# Patient Record
Sex: FEMALE | Race: WHITE | NOT HISPANIC OR LATINO
[De-identification: names, ages, dates, MRNs, and addresses within clinical notes are randomized per-mention and may not be internally consistent; named-entity substitution may affect disease eponyms.]

---

## 2018-05-01 ENCOUNTER — FORM ENCOUNTER (OUTPATIENT)
Age: 68
End: 2018-05-01

## 2018-05-13 ENCOUNTER — FORM ENCOUNTER (OUTPATIENT)
Age: 68
End: 2018-05-13

## 2018-05-14 ENCOUNTER — RESULT REVIEW (OUTPATIENT)
Age: 68
End: 2018-05-14

## 2018-05-22 ENCOUNTER — FORM ENCOUNTER (OUTPATIENT)
Age: 68
End: 2018-05-22

## 2018-07-25 PROBLEM — Z00.00 ENCOUNTER FOR PREVENTIVE HEALTH EXAMINATION: Status: ACTIVE | Noted: 2018-07-25

## 2018-07-27 ENCOUNTER — RECORD ABSTRACTING (OUTPATIENT)
Age: 68
End: 2018-07-27

## 2018-07-27 DIAGNOSIS — Z92.89 PERSONAL HISTORY OF OTHER MEDICAL TREATMENT: ICD-10-CM

## 2018-07-27 DIAGNOSIS — Z78.9 OTHER SPECIFIED HEALTH STATUS: ICD-10-CM

## 2018-07-27 DIAGNOSIS — Z86.39 PERSONAL HISTORY OF OTHER ENDOCRINE, NUTRITIONAL AND METABOLIC DISEASE: ICD-10-CM

## 2018-07-27 DIAGNOSIS — I10 ESSENTIAL (PRIMARY) HYPERTENSION: ICD-10-CM

## 2018-07-27 DIAGNOSIS — Z86.018 PERSONAL HISTORY OF OTHER BENIGN NEOPLASM: ICD-10-CM

## 2018-07-27 RX ORDER — AMLODIPINE BESYLATE AND VALSARTAN 10; 160 MG/1; MG/1
10-160 TABLET, FILM COATED ORAL
Refills: 0 | Status: ACTIVE | COMMUNITY

## 2018-09-11 ENCOUNTER — FORM ENCOUNTER (OUTPATIENT)
Age: 68
End: 2018-09-11

## 2018-09-12 ENCOUNTER — APPOINTMENT (OUTPATIENT)
Dept: GYNECOLOGIC ONCOLOGY | Facility: CLINIC | Age: 68
End: 2018-09-12
Payer: MEDICARE

## 2018-09-12 PROCEDURE — 76857 US EXAM PELVIC LIMITED: CPT | Mod: 59

## 2018-09-12 PROCEDURE — 76830 TRANSVAGINAL US NON-OB: CPT | Mod: 59

## 2018-09-12 PROCEDURE — 99214 OFFICE O/P EST MOD 30 MIN: CPT | Mod: 25

## 2018-09-12 PROCEDURE — 76376 3D RENDER W/INTRP POSTPROCES: CPT | Mod: TC,59

## 2018-12-06 ENCOUNTER — APPOINTMENT (OUTPATIENT)
Dept: GASTROENTEROLOGY | Facility: CLINIC | Age: 68
End: 2018-12-06
Payer: MEDICARE

## 2018-12-06 VITALS
SYSTOLIC BLOOD PRESSURE: 130 MMHG | HEIGHT: 62 IN | WEIGHT: 175 LBS | BODY MASS INDEX: 32.2 KG/M2 | DIASTOLIC BLOOD PRESSURE: 80 MMHG | HEART RATE: 82 BPM

## 2018-12-06 DIAGNOSIS — R10.84 GENERALIZED ABDOMINAL PAIN: ICD-10-CM

## 2018-12-06 DIAGNOSIS — Z12.11 ENCOUNTER FOR SCREENING FOR MALIGNANT NEOPLASM OF COLON: ICD-10-CM

## 2018-12-06 DIAGNOSIS — Z86.39 PERSONAL HISTORY OF OTHER ENDOCRINE, NUTRITIONAL AND METABOLIC DISEASE: ICD-10-CM

## 2018-12-06 PROCEDURE — 99204 OFFICE O/P NEW MOD 45 MIN: CPT

## 2018-12-06 PROCEDURE — 82272 OCCULT BLD FECES 1-3 TESTS: CPT

## 2019-02-07 ENCOUNTER — RESULT REVIEW (OUTPATIENT)
Age: 69
End: 2019-02-07

## 2019-02-18 ENCOUNTER — RESULT REVIEW (OUTPATIENT)
Age: 69
End: 2019-02-18

## 2019-05-06 ENCOUNTER — APPOINTMENT (OUTPATIENT)
Dept: GASTROENTEROLOGY | Facility: CLINIC | Age: 69
End: 2019-05-06
Payer: MEDICARE

## 2019-05-06 VITALS
HEART RATE: 79 BPM | HEIGHT: 62 IN | SYSTOLIC BLOOD PRESSURE: 130 MMHG | BODY MASS INDEX: 32.94 KG/M2 | DIASTOLIC BLOOD PRESSURE: 70 MMHG | WEIGHT: 179 LBS

## 2019-05-06 DIAGNOSIS — R10.32 LEFT LOWER QUADRANT PAIN: ICD-10-CM

## 2019-05-06 PROCEDURE — 99214 OFFICE O/P EST MOD 30 MIN: CPT

## 2019-05-06 NOTE — REASON FOR VISIT
[Follow-Up: _____] : a [unfilled] follow-up visit [FreeTextEntry1] : abdominal pain, family hx of colon cancer

## 2019-05-06 NOTE — HISTORY OF PRESENT ILLNESS
[de-identified] : The patient is to follow up with abdominal pain. She is quadrant and left lower quadrant pain for 8 months. Symptoms are mild-to-moderate. Symptoms seem to be more in the left side. It's a cramping pain. She denies constipation or diarrhea. She does have a family history of colon cancer in her mother. She had a colonoscopy in 2003 which showed diverticulosis and hemorrhoids as well as a hyperplastic polyp. Check CAT scan done in February which showed assistant fatty liver as well as retained stool. She was told to take Benefiber which has bleed she states she is having regular bowel movements but still gets his left lower quadrant cramping pain. She was IFOBT negative. The patient states she saw her GYN and workup is negative.

## 2019-05-06 NOTE — ASSESSMENT
[FreeTextEntry1] : A/P\par Patient is here for bilateral lower quadrant pain but worse on the left. She has a family history of colon cancer.\par -I discussed the risks and benefits of colonoscopy and patient was given the opportunity to ask questions. Colonoscopy is being done to rule out colon cancer, polyps or AVMs. Patient is medically optimized for the procedure.\par -MiraLax prep

## 2019-05-06 NOTE — PHYSICAL EXAM
[General Appearance - Alert] : alert [General Appearance - In No Acute Distress] : in no acute distress [General Appearance - Well Nourished] : well nourished [Sclera] : the sclera and conjunctiva were normal [General Appearance - Well Developed] : well developed [Outer Ear] : the ears and nose were normal in appearance [Both Tympanic Membranes Were Examined] : both tympanic membranes were normal [Hearing Threshold Finger Rub Not [place]] : hearing was normal [Neck Appearance] : the appearance of the neck was normal [Neck Cervical Mass (___cm)] : no neck mass was observed [Respiration, Rhythm And Depth] : normal respiratory rhythm and effort [] : no respiratory distress [Exaggerated Use Of Accessory Muscles For Inspiration] : no accessory muscle use [Auscultation Breath Sounds / Voice Sounds] : lungs were clear to auscultation bilaterally [Heart Rate And Rhythm] : heart rate was normal and rhythm regular [Apical Impulse] : the apical impulse was normal [Heart Sounds] : normal S1 and S2 [Abdomen Soft] : soft [Bowel Sounds] : normal bowel sounds [Abdomen Tenderness] : non-tender [Affect] : the affect was normal [Oriented To Time, Place, And Person] : oriented to person, place, and time [Impaired Insight] : insight and judgment were intact [Mood] : the mood was normal

## 2019-06-26 ENCOUNTER — APPOINTMENT (OUTPATIENT)
Dept: GASTROENTEROLOGY | Facility: GI CENTER | Age: 69
End: 2019-06-26
Payer: MEDICARE

## 2019-06-26 ENCOUNTER — OUTPATIENT (OUTPATIENT)
Dept: OUTPATIENT SERVICES | Facility: HOSPITAL | Age: 69
LOS: 1 days | End: 2019-06-26
Payer: MEDICARE

## 2019-06-26 DIAGNOSIS — Z80.0 FAMILY HISTORY OF MALIGNANT NEOPLASM OF DIGESTIVE ORGANS: ICD-10-CM

## 2019-06-26 DIAGNOSIS — R10.32 LEFT LOWER QUADRANT PAIN: ICD-10-CM

## 2019-06-26 LAB — GLUCOSE BLDC GLUCOMTR-MCNC: 144 MG/DL — HIGH (ref 70–99)

## 2019-06-26 PROCEDURE — 45378 DIAGNOSTIC COLONOSCOPY: CPT

## 2019-06-26 PROCEDURE — 82962 GLUCOSE BLOOD TEST: CPT

## 2019-06-26 NOTE — ASSESSMENT
[FreeTextEntry1] : A/P\par family hx of colon cancer\par Hemorrhoids\par Colonoscopy in 5 years\par _ please note - I did not see diverticulosis this exam ( though tics noted on last exam)

## 2019-06-26 NOTE — PROCEDURE
[Procedure Explained] : The procedure was explained [Colon Cancer Screening] : colon cancer screening [Allergies Reviewed] : allergies reviewed. [Benefits] : benefits [Risks] : Risks [Alternatives] : alternatives [Infection] : risk of infection [Bleeding] : bleeding risk [Consent Obtained] : written consent was obtained prior to the procedure and is detailed in the patient's record [Bowel Prep Kit] : the patient took the appropriate bowel preparation kit as directed [Patient] : the patient [Approved Diet Followed] : the patient avoided solid foods and adhered to the approved diet list for 24 hours prior to the procedure [Automated Blood Pressure Cuff] : automated blood pressure cuff [Cardiac Monitor] : cardiac monitor [Pulse Oximeter] : pulse oximeter [2] : 2 [Sedation Clearance] : the patient was cleared for moderate sedation [Prep Qualtiy: ___] : Prep Quality:  [unfilled] [Withdrawal Time: ___] : Withdrawal Time:  [unfilled] [Performed By: ___] : Performed by:  ANNIE [Left Lateral Decubitus] : The patient was positioned in the left lateral decubitus position [Cecum (Landmarks/Transillum)] : and guided to the cecum which was identified by the anatomic landmarks of the appendiceal orifice and ileocecal valve and by transillumination in the right lower quadrant [Insufflated] : insufflated [Single Pass Needed] : after a single pass [Retroflex View] : a retroflex view of the rectum was performed [Normal] : Normal [Tolerated Well] : the patient tolerated the procedure well [Hemorrhoids] : hemorrhoids [No Complications] : There were no complications [Vital Signs Stable] : the vital signs were stable [Abnormal Rectum] : a normal rectum [External Hemorrhoids] : no external hemorrhoids [Patient Rotated Into Alternating Positions] : the patient was not rotated [de-identified] : [id number]

## 2019-06-26 NOTE — REASON FOR VISIT
[Procedure: _________] : a [unfilled] procedure visit [FreeTextEntry1] : colonoscopy [Colonoscopy] : a colonoscopy [FreeTextEntry2] : family hx of colon cancer ( mother)

## 2019-06-26 NOTE — PROCEDURE
[Procedure Explained] : The procedure was explained [Colon Cancer Screening] : colon cancer screening [Allergies Reviewed] : allergies reviewed. [Benefits] : benefits [Risks] : Risks [Bleeding] : bleeding risk [Alternatives] : alternatives [Infection] : risk of infection [Consent Obtained] : written consent was obtained prior to the procedure and is detailed in the patient's record [Bowel Prep Kit] : the patient took the appropriate bowel preparation kit as directed [Patient] : the patient [Approved Diet Followed] : the patient avoided solid foods and adhered to the approved diet list for 24 hours prior to the procedure [Automated Blood Pressure Cuff] : automated blood pressure cuff [Cardiac Monitor] : cardiac monitor [2] : 2 [Pulse Oximeter] : pulse oximeter [Prep Qualtiy: ___] : Prep Quality:  [unfilled] [Sedation Clearance] : the patient was cleared for moderate sedation [Withdrawal Time: ___] : Withdrawal Time:  [unfilled] [Left Lateral Decubitus] : The patient was positioned in the left lateral decubitus position [Performed By: ___] : Performed by:  ANNIE [Cecum (Landmarks/Transillum)] : and guided to the cecum which was identified by the anatomic landmarks of the appendiceal orifice and ileocecal valve and by transillumination in the right lower quadrant [Retroflex View] : a retroflex view of the rectum was performed [Single Pass Needed] : after a single pass [Insufflated] : insufflated [Normal] : Normal [Hemorrhoids] : hemorrhoids [Tolerated Well] : the patient tolerated the procedure well [Vital Signs Stable] : the vital signs were stable [No Complications] : There were no complications [Abnormal Rectum] : a normal rectum [External Hemorrhoids] : no external hemorrhoids [Patient Rotated Into Alternating Positions] : the patient was not rotated [de-identified] : [id number]

## 2019-06-26 NOTE — PHYSICAL EXAM
[General Appearance - Alert] : alert [Sclera] : the sclera and conjunctiva were normal [General Appearance - Well Nourished] : well nourished [General Appearance - In No Acute Distress] : in no acute distress [Both Tympanic Membranes Were Examined] : both tympanic membranes were normal [Outer Ear] : the ears and nose were normal in appearance [Hearing Threshold Finger Rub Not Early] : hearing was normal [Neck Appearance] : the appearance of the neck was normal [Respiration, Rhythm And Depth] : normal respiratory rhythm and effort [Exaggerated Use Of Accessory Muscles For Inspiration] : no accessory muscle use [Auscultation Breath Sounds / Voice Sounds] : lungs were clear to auscultation bilaterally [Apical Impulse] : the apical impulse was normal [Heart Rate And Rhythm] : heart rate was normal and rhythm regular [Heart Sounds] : normal S1 and S2 [Bowel Sounds] : normal bowel sounds [Abdomen Soft] : soft [Abdomen Tenderness] : non-tender [Skin Color & Pigmentation] : normal skin color and pigmentation [Skin Turgor] : normal skin turgor [] : no rash [Affect] : the affect was normal [Impaired Insight] : insight and judgment were intact [Oriented To Time, Place, And Person] : oriented to person, place, and time

## 2019-06-26 NOTE — REASON FOR VISIT
[FreeTextEntry1] : colonoscopy [Procedure: _________] : a [unfilled] procedure visit [Colonoscopy] : a colonoscopy [FreeTextEntry2] : family hx of colon cancer ( mother)

## 2019-06-26 NOTE — PHYSICAL EXAM
[General Appearance - Alert] : alert [Sclera] : the sclera and conjunctiva were normal [General Appearance - In No Acute Distress] : in no acute distress [General Appearance - Well Nourished] : well nourished [Hearing Threshold Finger Rub Not [place]] : hearing was normal [Both Tympanic Membranes Were Examined] : both tympanic membranes were normal [Outer Ear] : the ears and nose were normal in appearance [Neck Appearance] : the appearance of the neck was normal [Respiration, Rhythm And Depth] : normal respiratory rhythm and effort [Exaggerated Use Of Accessory Muscles For Inspiration] : no accessory muscle use [Auscultation Breath Sounds / Voice Sounds] : lungs were clear to auscultation bilaterally [Apical Impulse] : the apical impulse was normal [Heart Rate And Rhythm] : heart rate was normal and rhythm regular [Heart Sounds] : normal S1 and S2 [Bowel Sounds] : normal bowel sounds [Abdomen Soft] : soft [Abdomen Tenderness] : non-tender [Skin Color & Pigmentation] : normal skin color and pigmentation [Skin Turgor] : normal skin turgor [] : no rash [Oriented To Time, Place, And Person] : oriented to person, place, and time [Impaired Insight] : insight and judgment were intact [Affect] : the affect was normal

## 2019-08-05 PROBLEM — R10.32 LEFT LOWER QUADRANT PAIN: Status: ACTIVE | Noted: 2019-05-06

## 2020-01-21 ENCOUNTER — APPOINTMENT (OUTPATIENT)
Dept: GASTROENTEROLOGY | Facility: CLINIC | Age: 70
End: 2020-01-21
Payer: MEDICARE

## 2020-01-21 VITALS
WEIGHT: 175 LBS | BODY MASS INDEX: 32.2 KG/M2 | SYSTOLIC BLOOD PRESSURE: 130 MMHG | RESPIRATION RATE: 16 BRPM | OXYGEN SATURATION: 98 % | HEIGHT: 62 IN | DIASTOLIC BLOOD PRESSURE: 80 MMHG | HEART RATE: 82 BPM

## 2020-01-21 DIAGNOSIS — K21.9 GASTRO-ESOPHAGEAL REFLUX DISEASE W/OUT ESOPHAGITIS: ICD-10-CM

## 2020-01-21 PROCEDURE — 99214 OFFICE O/P EST MOD 30 MIN: CPT

## 2020-01-21 NOTE — HISTORY OF PRESENT ILLNESS
[de-identified] : The patient is alert history of colon cancer, sister had a hepatitis, GERD\par     The patient is a family history of colon cancer. Her last colonoscopy was June of 2019 which showed internal hemorrhoids. No polyps. She had a hyperplastic polyp in the past.\par     Patient has mildly elevated liver function tests. In December her ALT was 50 and AST is 46. Ultrasound showed fatty liver and liver cysts. Hepatitis B and C. were negative in December.\par    Patient has had blurred or 10 years. She gets symptoms of upper epigastric discomfort. It occurs every day. It is a burning discomfort which will last an hour. It is worse at night. She has decreased appetite and bloating. Despite having symptoms everyday she only takes omeprazole 40 mg once a week. Her last EGD was 5 years ago.

## 2020-01-21 NOTE — PHYSICAL EXAM
[General Appearance - Alert] : alert [General Appearance - In No Acute Distress] : in no acute distress [General Appearance - Well Nourished] : well nourished [General Appearance - Well Developed] : well developed [Outer Ear] : the ears and nose were normal in appearance [Sclera] : the sclera and conjunctiva were normal [Neck Appearance] : the appearance of the neck was normal [Neck Cervical Mass (___cm)] : no neck mass was observed [Respiration, Rhythm And Depth] : normal respiratory rhythm and effort [Exaggerated Use Of Accessory Muscles For Inspiration] : no accessory muscle use [Auscultation Breath Sounds / Voice Sounds] : lungs were clear to auscultation bilaterally [Apical Impulse] : the apical impulse was normal [Heart Rate And Rhythm] : heart rate was normal and rhythm regular [Heart Sounds] : normal S1 and S2 [Bowel Sounds] : normal bowel sounds [Abdomen Soft] : soft [Abdomen Tenderness] : non-tender [Femoral Lymph Nodes Enlarged Bilaterally] : femoral [Skin Color & Pigmentation] : normal skin color and pigmentation [] : no rash [Skin Turgor] : normal skin turgor [Oriented To Time, Place, And Person] : oriented to person, place, and time [Impaired Insight] : insight and judgment were intact [Affect] : the affect was normal

## 2020-01-21 NOTE — ASSESSMENT
[FreeTextEntry1] : A/P\par -gerd\par -Today's instructions for acid reflux include avoid provocative foods. For example citrus alcohol coffee chocolate mints. Smaller meals, no eating 3 hours prior to bedtime and elevate head of the bed prior to sleep.\par -pt told to take prilosec 40 qd\par  I discussed the risks and benefits of EGD and patient was given opportunity to ask questions. EGD to r/o Rey's  esophagus, PUD, mass, AVM'S. Pt is medically optimized for EGD\par \par Steatohepatitis\par - low fat diet , Hep B, C negative\par \par family hx of colon cancer- Colonoscopy in 4 years- 2023

## 2020-03-06 ENCOUNTER — RESULT REVIEW (OUTPATIENT)
Age: 70
End: 2020-03-06

## 2020-03-06 ENCOUNTER — OUTPATIENT (OUTPATIENT)
Dept: OUTPATIENT SERVICES | Facility: HOSPITAL | Age: 70
LOS: 1 days | End: 2020-03-06
Payer: MEDICARE

## 2020-03-06 ENCOUNTER — APPOINTMENT (OUTPATIENT)
Dept: GASTROENTEROLOGY | Facility: GI CENTER | Age: 70
End: 2020-03-06
Payer: MEDICARE

## 2020-03-06 DIAGNOSIS — K21.9 GASTRO-ESOPHAGEAL REFLUX DISEASE WITHOUT ESOPHAGITIS: ICD-10-CM

## 2020-03-06 LAB — GLUCOSE BLDC GLUCOMTR-MCNC: 164 MG/DL — HIGH (ref 70–99)

## 2020-03-06 PROCEDURE — 88342 IMHCHEM/IMCYTCHM 1ST ANTB: CPT | Mod: 26

## 2020-03-06 PROCEDURE — 88305 TISSUE EXAM BY PATHOLOGIST: CPT | Mod: 26

## 2020-03-06 PROCEDURE — 43239 EGD BIOPSY SINGLE/MULTIPLE: CPT

## 2020-03-06 PROCEDURE — 82962 GLUCOSE BLOOD TEST: CPT

## 2020-03-06 PROCEDURE — 88342 IMHCHEM/IMCYTCHM 1ST ANTB: CPT

## 2020-03-06 PROCEDURE — 88305 TISSUE EXAM BY PATHOLOGIST: CPT

## 2020-03-06 NOTE — ASSESSMENT
[FreeTextEntry1] : A/P\par GERD\par Today's instructions for acid reflux include avoid provocative foods. For example citrus alcohol coffee chocolate mints. Smaller meals, no eating 3 hours prior to bedtime and elevate head of the bed prior to sleep.\par  - Mildly irregular Z line\par  call in one week for biopsy of stomach  and esophagus to r/o Hp and Rey's esophagus\par F/U in 3 months\par Prilosec 40 mg qd\par

## 2020-03-06 NOTE — PROCEDURE
[GERD] : GERD [Sent to Pathology] : was sent to pathology for analysis [Tolerated Well] : the patient tolerated the procedure well [Vital Signs Stable] : the vital signs were stable [With Biopsy] : with biopsy [Gastric Ulcer] : gastric ulcer [Procedure Explained] : The procedure was explained [Allergies Reviewed] : allergies reviewed. [Risks] : Risks [Benefits] : benefits [Alternatives] : alternatives [Consent Obtained] : written consent was obtained prior to the procedure and is detailed in the patient's record [Patient] : the patient [Automated Blood Pressure Cuff] : automated blood pressure cuff [Cardiac Monitor] : cardiac monitor [Pulse Oximeter] : pulse oximeter [2] : 2 [Sedation Clearance] : the patient was cleared for moderate sedation [Performed By: ___] : Performed by:  ANNIE [Normal] : Normal [Biopsy] : biopsy [de-identified] :  Midly irregular Z line [de-identified] : Random biopsy on antrum and body

## 2020-03-06 NOTE — PHYSICAL EXAM
[General Appearance - Alert] : alert [General Appearance - In No Acute Distress] : in no acute distress [General Appearance - Well Nourished] : well nourished [General Appearance - Well Developed] : well developed [Sclera] : the sclera and conjunctiva were normal [Outer Ear] : the ears and nose were normal in appearance [Neck Appearance] : the appearance of the neck was normal [] : no respiratory distress [Respiration, Rhythm And Depth] : normal respiratory rhythm and effort [Exaggerated Use Of Accessory Muscles For Inspiration] : no accessory muscle use [Apical Impulse] : the apical impulse was normal [Heart Rate And Rhythm] : heart rate was normal and rhythm regular [Heart Sounds] : normal S1 and S2 [Bowel Sounds] : normal bowel sounds [Abdomen Soft] : soft [Abdomen Tenderness] : non-tender [Skin Color & Pigmentation] : normal skin color and pigmentation [Oriented To Time, Place, And Person] : oriented to person, place, and time [Impaired Insight] : insight and judgment were intact [Affect] : the affect was normal

## 2020-03-06 NOTE — PROCEDURE
[GERD] : GERD [Sent to Pathology] : was sent to pathology for analysis [Tolerated Well] : the patient tolerated the procedure well [Vital Signs Stable] : the vital signs were stable [With Biopsy] : with biopsy [Gastric Ulcer] : gastric ulcer [Procedure Explained] : The procedure was explained [Allergies Reviewed] : allergies reviewed. [Risks] : Risks [Benefits] : benefits [Alternatives] : alternatives [Consent Obtained] : written consent was obtained prior to the procedure and is detailed in the patient's record [Patient] : the patient [Automated Blood Pressure Cuff] : automated blood pressure cuff [Cardiac Monitor] : cardiac monitor [Pulse Oximeter] : pulse oximeter [2] : 2 [Sedation Clearance] : the patient was cleared for moderate sedation [Performed By: ___] : Performed by:  ANNIE [Normal] : Normal [Biopsy] : biopsy [de-identified] :  Midly irregular Z line [de-identified] : Random biopsy on antrum and body

## 2020-03-06 NOTE — PROCEDURE
[GERD] : GERD [Sent to Pathology] : was sent to pathology for analysis [Tolerated Well] : the patient tolerated the procedure well [Vital Signs Stable] : the vital signs were stable [With Biopsy] : with biopsy [Gastric Ulcer] : gastric ulcer [Procedure Explained] : The procedure was explained [Allergies Reviewed] : allergies reviewed. [Risks] : Risks [Benefits] : benefits [Alternatives] : alternatives [Consent Obtained] : written consent was obtained prior to the procedure and is detailed in the patient's record [Patient] : the patient [Automated Blood Pressure Cuff] : automated blood pressure cuff [Cardiac Monitor] : cardiac monitor [Pulse Oximeter] : pulse oximeter [2] : 2 [Sedation Clearance] : the patient was cleared for moderate sedation [Performed By: ___] : Performed by:  ANNIE [Normal] : Normal [Biopsy] : biopsy [de-identified] :  Midly irregular Z line [de-identified] : Random biopsy on antrum and body

## 2020-03-11 LAB — SURGICAL PATHOLOGY STUDY: SIGNIFICANT CHANGE UP

## 2020-05-28 ENCOUNTER — APPOINTMENT (OUTPATIENT)
Dept: GASTROENTEROLOGY | Facility: CLINIC | Age: 70
End: 2020-05-28

## 2020-12-16 PROBLEM — Z12.11 ENCOUNTER FOR SCREENING COLONOSCOPY: Status: RESOLVED | Noted: 2018-12-06 | Resolved: 2020-12-16

## 2023-07-26 ENCOUNTER — NON-APPOINTMENT (OUTPATIENT)
Age: 73
End: 2023-07-26

## 2023-07-26 ENCOUNTER — APPOINTMENT (OUTPATIENT)
Dept: OTOLARYNGOLOGY | Facility: CLINIC | Age: 73
End: 2023-07-26
Payer: MEDICARE

## 2023-07-26 VITALS — WEIGHT: 165 LBS | HEIGHT: 62 IN | BODY MASS INDEX: 30.36 KG/M2

## 2023-07-26 DIAGNOSIS — E04.2 NONTOXIC MULTINODULAR GOITER: ICD-10-CM

## 2023-07-26 DIAGNOSIS — H61.20 IMPACTED CERUMEN, UNSPECIFIED EAR: ICD-10-CM

## 2023-07-26 PROCEDURE — 99204 OFFICE O/P NEW MOD 45 MIN: CPT | Mod: 25

## 2023-07-26 PROCEDURE — 69210 REMOVE IMPACTED EAR WAX UNI: CPT

## 2023-07-26 NOTE — ASSESSMENT
[FreeTextEntry1] : US DONE AT [facility] 06/29/2023 REVIEWED\par MULTINODULAR THYROID\par NON OF THE NODULES MEETS THE CRITERIA FOR FNA AT PRESENT\par ANNUAL US OR SOONER IF ANY SYMPTOMS OR CHANGE\par ENDO FOLLOW UP\par AVOID Q TIPS

## 2023-07-26 NOTE — REVIEW OF SYSTEMS
[Ear Pain] : ear pain [Ear Itch] : ear itch [Heartburn] : heartburn [Negative] : Heme/Lymph [Patient Intake Form Reviewed] : Patient intake form was reviewed [FreeTextEntry1] : difficulty swallowing, headache, fatigue, joint aches

## 2023-07-26 NOTE — PHYSICAL EXAM
[de-identified] : MARTA IMPACTED CERUMEN REMOVED/ HEARING IMPROVED [Normal] : mucosa is normal [Midline] : trachea located in midline position

## 2024-01-22 ENCOUNTER — APPOINTMENT (OUTPATIENT)
Dept: GYNECOLOGIC ONCOLOGY | Facility: CLINIC | Age: 74
End: 2024-01-22
Payer: MEDICARE

## 2024-01-22 PROCEDURE — 99204 OFFICE O/P NEW MOD 45 MIN: CPT | Mod: 25

## 2024-01-22 PROCEDURE — 58100 BIOPSY OF UTERUS LINING: CPT | Mod: 59

## 2024-01-22 RX ORDER — MISOPROSTOL 100 UG/1
100 TABLET ORAL
Refills: 0 | Status: DISCONTINUED | COMMUNITY
End: 2024-01-22

## 2024-01-22 RX ORDER — ORAL SEMAGLUTIDE 14 MG/1
TABLET ORAL
Refills: 0 | Status: ACTIVE | COMMUNITY

## 2024-01-22 RX ORDER — SITAGLIPTIN AND METFORMIN HYDROCHLORIDE 50; 500 MG/1; MG/1
50-500 TABLET, FILM COATED ORAL
Refills: 0 | Status: DISCONTINUED | COMMUNITY
End: 2024-01-22

## 2024-01-23 NOTE — PROCEDURE
[Endometrial Biopsy] : an endometrial biopsy [Thickened Endometrium] : thickened endometrium [Patient] : the patient [Verbal Consent] : verbal consent was obtained prior to the procedure and is detailed in the patient's record [Betadine] : betadine [Silver Nitrate] : silver nitrate [Direct Pressure] : direct pressure [No Complications] : none [Yes] : the specimen was sent to pathology [Tolerated Well] : the patient tolerated the procedure well

## 2024-01-24 NOTE — HISTORY OF PRESENT ILLNESS
[FreeTextEntry1] : 74yo  LMP presents today with referral from Dr. Chong for thickened endometrial lining. Pt reports pelvic pain L>R for a few months now. She reports urinary frequency and burning for a few months as well. She states she had urinalysis with PCP which was negative. She underwent pelvic US on 23 revealing 1.2cm thick endometrial lining which is cystic in appearance and may reflect hyperplasia. Lining previously measuring 1.5cm's. Pt was also previously seen by our practice in 2018 for thickened lining as well, s/p D&C which was benign at that time. Pt denies abnormal bleeding or issues with bowel habits.   LPAP- , normal pp LMammo- 2023: probably benign mass for which 6 month targeted US was reccomended. Pt requesting referral to breast surgeon as recc by her PCP, referral to Dr. Hinton provided.  LColonoscopy-appt 2024 for repeat. Last , normal pp LBone Density Scan- , normal pp

## 2024-01-24 NOTE — REVIEW OF SYSTEMS
[Negative] : Musculoskeletal [Hematuria] : no hematuria [Dysuria] : no dysuria [Vaginal Discharge] : no vaginal discharge [Dyspareunia] : no dyspareunia [Abn Vag Bleeding] : no abnormal vaginal bleeding [Incontinence] : no incontinence [FreeTextEntry4] : urinary frequency and burning, pelvic pain

## 2024-01-24 NOTE — CHIEF COMPLAINT
[FreeTextEntry1] : [facility] [phone] at [address] [place] [address]

## 2024-01-24 NOTE — END OF VISIT
[FreeTextEntry3] :  I, Dr. Marin Walker personally performed the evaluation and management (E/M) services for this new patient. That E/M includes conducting the initial examination, assessing all conditions, and establishing the plan of care.  Today, Debi Barrera PA-C, was here to observe my evaluation and management services for this patient to be followed going forward.

## 2024-01-24 NOTE — ASSESSMENT
[FreeTextEntry1] : 74yo with thickened endometrium, pelvic pain.   I discussed with patient that I would recommend biopsy to rule out a precancer or cancer despite absence of PMB due to the thickness of her lining noted on imaging. I offered patient option to perform today in office vs. under anesthesia. Pt opted to try today in office which was successful.

## 2024-01-24 NOTE — PHYSICAL EXAM
[Chaperone Present] : A chaperone was present in the examining room during all aspects of the physical examination [Normal] : Bimanual Exam: Normal [Abnormal] : Bimanual Exam: Abnormal [FreeTextEntry1] : Mimi Barrera PA-C [de-identified] : uterus felt to be adhered to bladder.

## 2024-01-25 LAB — CORE LAB BIOPSY: NORMAL

## 2024-01-26 ENCOUNTER — TRANSCRIPTION ENCOUNTER (OUTPATIENT)
Age: 74
End: 2024-01-26

## 2024-02-09 ENCOUNTER — APPOINTMENT (OUTPATIENT)
Dept: GYNECOLOGIC ONCOLOGY | Facility: CLINIC | Age: 74
End: 2024-02-09
Payer: MEDICARE

## 2024-02-09 PROCEDURE — 99214 OFFICE O/P EST MOD 30 MIN: CPT

## 2024-02-09 NOTE — END OF VISIT
[FreeTextEntry3] : D&C, hysteroscopic polypectomy PCP clearance, CBC, BMP, ECG, presurgical testing [Time Spent: ___ minutes] : I have spent [unfilled] minutes of time on the encounter.

## 2024-02-09 NOTE — ASSESSMENT
[FreeTextEntry1] : Pt is a 72 yo with PMB due to endometrial polyp. Pathology without atypia or malignancy. Recommendation for hysterescopic resection as without it she could continue to have bleeding. There is small risk polyps can turn cancerous. They agree to proceed with setting up D&C, hysterscopic polypectomy.  I discussed at length with the patient the nature, purpose, risks, benefits, and alternatives to dilation with curettage and possible hysteroscopy.   She understands the risks to include (but not be limited to): uterine perforation with possible need for laparoscopy and/or laparotomy; infection with need for hospitalization; fluid overload with possible critical illness as a consequence; and bleeding with need for transfusion.  The patient agrees to proceed.

## 2024-02-09 NOTE — HISTORY OF PRESENT ILLNESS
[Home] : at home, [unfilled] , at the time of the visit. [Other Location: e.g. Home (Enter Location, City,State)___] : at [unfilled] [Family Member] : family member [Verbal consent obtained from patient] : the patient, [unfilled] [FreeTextEntry1] : Pt is a 74 yo with PMB who presents for discussion of endometrial biopsy results. Her son the health care proxy is on the phone call and will convey discussion to patient.

## 2024-03-01 ENCOUNTER — OUTPATIENT (OUTPATIENT)
Dept: OUTPATIENT SERVICES | Facility: HOSPITAL | Age: 74
LOS: 1 days | End: 2024-03-01
Payer: MEDICARE

## 2024-03-01 DIAGNOSIS — N84.0 POLYP OF CORPUS UTERI: ICD-10-CM

## 2024-03-01 DIAGNOSIS — Z01.818 ENCOUNTER FOR OTHER PREPROCEDURAL EXAMINATION: ICD-10-CM

## 2024-03-01 LAB
A1C WITH ESTIMATED AVERAGE GLUCOSE RESULT: 6.8 % — HIGH (ref 4–5.6)
ANION GAP SERPL CALC-SCNC: 14 MMOL/L — SIGNIFICANT CHANGE UP (ref 5–17)
APPEARANCE UR: CLEAR — SIGNIFICANT CHANGE UP
APTT BLD: 30.6 SEC — SIGNIFICANT CHANGE UP (ref 24.5–35.6)
BASOPHILS # BLD AUTO: 0.08 K/UL — SIGNIFICANT CHANGE UP (ref 0–0.2)
BASOPHILS NFR BLD AUTO: 1 % — SIGNIFICANT CHANGE UP (ref 0–2)
BILIRUB UR-MCNC: NEGATIVE — SIGNIFICANT CHANGE UP
BUN SERPL-MCNC: 15.7 MG/DL — SIGNIFICANT CHANGE UP (ref 8–20)
CALCIUM SERPL-MCNC: 9.5 MG/DL — SIGNIFICANT CHANGE UP (ref 8.4–10.5)
CHLORIDE SERPL-SCNC: 103 MMOL/L — SIGNIFICANT CHANGE UP (ref 96–108)
CO2 SERPL-SCNC: 27 MMOL/L — SIGNIFICANT CHANGE UP (ref 22–29)
COLOR SPEC: YELLOW — SIGNIFICANT CHANGE UP
CREAT SERPL-MCNC: 0.64 MG/DL — SIGNIFICANT CHANGE UP (ref 0.5–1.3)
DIFF PNL FLD: NEGATIVE — SIGNIFICANT CHANGE UP
EGFR: 94 ML/MIN/1.73M2 — SIGNIFICANT CHANGE UP
EOSINOPHIL # BLD AUTO: 0.08 K/UL — SIGNIFICANT CHANGE UP (ref 0–0.5)
EOSINOPHIL NFR BLD AUTO: 1 % — SIGNIFICANT CHANGE UP (ref 0–6)
ESTIMATED AVERAGE GLUCOSE: 148 MG/DL — HIGH (ref 68–114)
GLUCOSE SERPL-MCNC: 104 MG/DL — HIGH (ref 70–99)
GLUCOSE UR QL: NEGATIVE MG/DL — SIGNIFICANT CHANGE UP
HCT VFR BLD CALC: 42.6 % — SIGNIFICANT CHANGE UP (ref 34.5–45)
HGB BLD-MCNC: 14.2 G/DL — SIGNIFICANT CHANGE UP (ref 11.5–15.5)
IMM GRANULOCYTES NFR BLD AUTO: 0.4 % — SIGNIFICANT CHANGE UP (ref 0–0.9)
INR BLD: 0.98 RATIO — SIGNIFICANT CHANGE UP (ref 0.85–1.18)
KETONES UR-MCNC: NEGATIVE MG/DL — SIGNIFICANT CHANGE UP
LEUKOCYTE ESTERASE UR-ACNC: NEGATIVE — SIGNIFICANT CHANGE UP
LYMPHOCYTES # BLD AUTO: 2.67 K/UL — SIGNIFICANT CHANGE UP (ref 1–3.3)
LYMPHOCYTES # BLD AUTO: 32.7 % — SIGNIFICANT CHANGE UP (ref 13–44)
MCHC RBC-ENTMCNC: 30.5 PG — SIGNIFICANT CHANGE UP (ref 27–34)
MCHC RBC-ENTMCNC: 33.3 GM/DL — SIGNIFICANT CHANGE UP (ref 32–36)
MCV RBC AUTO: 91.6 FL — SIGNIFICANT CHANGE UP (ref 80–100)
MONOCYTES # BLD AUTO: 0.63 K/UL — SIGNIFICANT CHANGE UP (ref 0–0.9)
MONOCYTES NFR BLD AUTO: 7.7 % — SIGNIFICANT CHANGE UP (ref 2–14)
NEUTROPHILS # BLD AUTO: 4.67 K/UL — SIGNIFICANT CHANGE UP (ref 1.8–7.4)
NEUTROPHILS NFR BLD AUTO: 57.2 % — SIGNIFICANT CHANGE UP (ref 43–77)
NITRITE UR-MCNC: NEGATIVE — SIGNIFICANT CHANGE UP
PH UR: 7 — SIGNIFICANT CHANGE UP (ref 5–8)
PLATELET # BLD AUTO: 309 K/UL — SIGNIFICANT CHANGE UP (ref 150–400)
POTASSIUM SERPL-MCNC: 4.6 MMOL/L — SIGNIFICANT CHANGE UP (ref 3.5–5.3)
POTASSIUM SERPL-SCNC: 4.6 MMOL/L — SIGNIFICANT CHANGE UP (ref 3.5–5.3)
PROT UR-MCNC: NEGATIVE MG/DL — SIGNIFICANT CHANGE UP
PROTHROM AB SERPL-ACNC: 10.9 SEC — SIGNIFICANT CHANGE UP (ref 9.5–13)
RBC # BLD: 4.65 M/UL — SIGNIFICANT CHANGE UP (ref 3.8–5.2)
RBC # FLD: 13.1 % — SIGNIFICANT CHANGE UP (ref 10.3–14.5)
SODIUM SERPL-SCNC: 143 MMOL/L — SIGNIFICANT CHANGE UP (ref 135–145)
SP GR SPEC: 1.01 — SIGNIFICANT CHANGE UP (ref 1–1.03)
UROBILINOGEN FLD QL: 0.2 MG/DL — SIGNIFICANT CHANGE UP (ref 0.2–1)
WBC # BLD: 8.16 K/UL — SIGNIFICANT CHANGE UP (ref 3.8–10.5)
WBC # FLD AUTO: 8.16 K/UL — SIGNIFICANT CHANGE UP (ref 3.8–10.5)

## 2024-03-01 PROCEDURE — 80048 BASIC METABOLIC PNL TOTAL CA: CPT

## 2024-03-01 PROCEDURE — 85610 PROTHROMBIN TIME: CPT

## 2024-03-01 PROCEDURE — 81003 URINALYSIS AUTO W/O SCOPE: CPT

## 2024-03-01 PROCEDURE — G0463: CPT

## 2024-03-01 PROCEDURE — 85025 COMPLETE CBC W/AUTO DIFF WBC: CPT

## 2024-03-01 PROCEDURE — 83036 HEMOGLOBIN GLYCOSYLATED A1C: CPT

## 2024-03-01 PROCEDURE — 93010 ELECTROCARDIOGRAM REPORT: CPT

## 2024-03-01 PROCEDURE — 93005 ELECTROCARDIOGRAM TRACING: CPT

## 2024-03-01 PROCEDURE — 85730 THROMBOPLASTIN TIME PARTIAL: CPT

## 2024-03-01 PROCEDURE — 36415 COLL VENOUS BLD VENIPUNCTURE: CPT

## 2024-03-01 PROCEDURE — 87086 URINE CULTURE/COLONY COUNT: CPT

## 2024-03-02 LAB
CULTURE RESULTS: SIGNIFICANT CHANGE UP
SPECIMEN SOURCE: SIGNIFICANT CHANGE UP

## 2024-03-06 ENCOUNTER — APPOINTMENT (OUTPATIENT)
Dept: SURGERY | Facility: CLINIC | Age: 74
End: 2024-03-06
Payer: MEDICARE

## 2024-03-06 VITALS
WEIGHT: 165 LBS | BODY MASS INDEX: 30.36 KG/M2 | SYSTOLIC BLOOD PRESSURE: 140 MMHG | HEIGHT: 62 IN | DIASTOLIC BLOOD PRESSURE: 80 MMHG

## 2024-03-06 DIAGNOSIS — R92.30 DENSE BREASTS, UNSPECIFIED: ICD-10-CM

## 2024-03-06 DIAGNOSIS — R92.8 OTHER ABNORMAL AND INCONCLUSIVE FINDINGS ON DIAGNOSTIC IMAGING OF BREAST: ICD-10-CM

## 2024-03-06 PROCEDURE — 99204 OFFICE O/P NEW MOD 45 MIN: CPT

## 2024-03-06 NOTE — ASSESSMENT
[FreeTextEntry1] : 73 year old female with a left breast mass, BIRADS 3.    CBE and imaging from November reviewed. We discussed the clinical significance of a BIRADS 3 classification and the <2% chance of finding an underlying malignancy. . We discussed management options of close surveillance every 6 months for a total of 2 years vs definitive diagnosis with biopsy. She elected for biopsy. Will obtain and have her followup after to review results and discuss next steps.   Patient verbalized understanding for all treatment plans discussed. All of her questions were answered to the best of my ability. She was encouraged to call the office if any questions or concerns or come in sooner if needed.   Plan: 1. Left US-guided biopsy 2. Followup after

## 2024-03-06 NOTE — PAST MEDICAL HISTORY
[Postmenopausal] : The patient is postmenopausal [Menarche Age ____] : age at menarche was [unfilled] [Menopause Age____] : age at menopause was [unfilled] [Definite ___ (Date)] : the last menstrual period was [unfilled] [Total Preg ___] : G[unfilled] [Live Births ___] : P[unfilled]  [Age At Live Birth ___] : Age at live birth: [unfilled] [FreeTextEntry5] : c section x 2  [FreeTextEntry6] : denies  [FreeTextEntry7] : yes for one month  [FreeTextEntry8] : denies

## 2024-03-06 NOTE — PHYSICAL EXAM
[Normocephalic] : normocephalic [EOMI] : extra ocular movement intact [Atraumatic] : atraumatic [PERRL] : pupils equal, round and reactive to light [Conjunctiva pink] : conjunctiva pink [Sclera nonicteric] : sclera nonicteric [Supple] : supple [No Supraclavicular Adenopathy] : no supraclavicular adenopathy [No Cervical Adenopathy] : no cervical adenopathy [Examined in the supine and seated position] : examined in the supine and seated position [No Axillary Lymphadenopathy] : no left axillary lymphadenopathy [No Edema] : no edema [Bra Size: ___] : Bra Size: [unfilled] [Symmetrical] : symmetrical [No dominant masses] : no dominant masses in right breast  [No dominant masses] : no dominant masses left breast [No Nipple Retraction] : no left nipple retraction [No Nipple Discharge] : no left nipple discharge

## 2024-03-11 ENCOUNTER — APPOINTMENT (OUTPATIENT)
Dept: GASTROENTEROLOGY | Facility: CLINIC | Age: 74
End: 2024-03-11
Payer: MEDICARE

## 2024-03-11 VITALS
RESPIRATION RATE: 16 BRPM | HEIGHT: 62 IN | DIASTOLIC BLOOD PRESSURE: 85 MMHG | SYSTOLIC BLOOD PRESSURE: 146 MMHG | HEART RATE: 84 BPM | OXYGEN SATURATION: 98 % | BODY MASS INDEX: 30.36 KG/M2 | WEIGHT: 165 LBS

## 2024-03-11 DIAGNOSIS — Z13.9 ENCOUNTER FOR SCREENING, UNSPECIFIED: ICD-10-CM

## 2024-03-11 PROCEDURE — 99204 OFFICE O/P NEW MOD 45 MIN: CPT

## 2024-03-11 RX ORDER — OMEPRAZOLE 20 MG/1
TABLET, DELAYED RELEASE ORAL
Refills: 0 | Status: ACTIVE | COMMUNITY

## 2024-03-11 RX ORDER — SITAGLIPTIN AND METFORMIN HYDROCHLORIDE 50; 1000 MG/1; MG/1
50-1000 TABLET, FILM COATED ORAL
Refills: 0 | Status: ACTIVE | COMMUNITY

## 2024-03-11 NOTE — ASSESSMENT
[FreeTextEntry1] : A/P Patient with history of GERD controlled with omeprazole as needed Today's instructions for acid reflux include avoid provocative foods. For example citrus alcohol coffee chocolate mints. Smaller meals, no eating 3 hours prior to bedtime and elevate head of the bed prior to sleep.  Family history of colon cancer in her mother due for colonoscopy  I discussed the risks and benefits of colonoscopy and patient was given opportunity to ask questions. Colonoscopy to r/o colon cancer, polyps, AVM's. Patient is medically optimized for the procedure MiraLAX prep  Known history of fatty liver-discussed low-fat diet.  LFTs normal

## 2024-03-11 NOTE — HISTORY OF PRESENT ILLNESS
[FreeTextEntry1] : Patient with a history of hypertension diabetes hypothyroidism Patient with history of GERD for 14 years.  Gets epigastric discomfort few times a month.  Symptoms resolved with Prilosec 40 mg 1-2 times a month.  Thousand 20 negative for H. pylori and Rey's esophagus  Patient with family history of colon cancer in her mother.  Patient has no diarrhea constipation black stools bloody stools abdominal pain.  Colonoscopy in 2019 was negative for adenomatous polyps.  Patient had hyperplastic polyps.  And hemorrhoids.  Patient with known history of fatty liver seen on CAT scan, benign liver cyst.  In the past liver function test were mildly elevated.  However October 2023 LFTs normal.  Hepatitis B C is negative.  January 2024 hemoglobin 13.7

## 2024-03-11 NOTE — REASON FOR VISIT
[Initial Evaluation] : an initial evaluation [FreeTextEntry1] : GERD, family history of colon cancer

## 2024-03-11 NOTE — PHYSICAL EXAM
[Normal Voice/Communication] : normal voice/communication [Alert] : alert [Healthy Appearing] : healthy appearing [No Respiratory Distress] : no respiratory distress [No Acc Muscle Use] : no accessory muscle use [Respiration, Rhythm And Depth] : normal respiratory rhythm and effort [Auscultation Breath Sounds / Voice Sounds] : lungs were clear to auscultation bilaterally [Heart Rate And Rhythm] : heart rate was normal and rhythm regular [Normal S1, S2] : normal S1 and S2 [Bowel Sounds] : normal bowel sounds [Abdomen Tenderness] : non-tender [No Masses] : no abdominal mass palpated [Abdomen Soft] : soft [Oriented To Time, Place, And Person] : oriented to person, place, and time

## 2024-03-18 ENCOUNTER — RESULT REVIEW (OUTPATIENT)
Age: 74
End: 2024-03-18

## 2024-03-18 ENCOUNTER — TRANSCRIPTION ENCOUNTER (OUTPATIENT)
Age: 74
End: 2024-03-18

## 2024-03-18 ENCOUNTER — OUTPATIENT (OUTPATIENT)
Dept: INPATIENT UNIT | Facility: HOSPITAL | Age: 74
LOS: 1 days | End: 2024-03-18
Payer: MEDICARE

## 2024-03-18 VITALS
HEIGHT: 62 IN | RESPIRATION RATE: 20 BRPM | OXYGEN SATURATION: 98 % | DIASTOLIC BLOOD PRESSURE: 77 MMHG | WEIGHT: 165.35 LBS | HEART RATE: 82 BPM | TEMPERATURE: 97 F | SYSTOLIC BLOOD PRESSURE: 146 MMHG

## 2024-03-18 VITALS
HEART RATE: 74 BPM | RESPIRATION RATE: 21 BRPM | SYSTOLIC BLOOD PRESSURE: 141 MMHG | OXYGEN SATURATION: 93 % | DIASTOLIC BLOOD PRESSURE: 77 MMHG

## 2024-03-18 DIAGNOSIS — N84.0 POLYP OF CORPUS UTERI: ICD-10-CM

## 2024-03-18 LAB
GLUCOSE BLDC GLUCOMTR-MCNC: 109 MG/DL — HIGH (ref 70–99)
GLUCOSE BLDC GLUCOMTR-MCNC: 122 MG/DL — HIGH (ref 70–99)
GLUCOSE BLDC GLUCOMTR-MCNC: 125 MG/DL — HIGH (ref 70–99)

## 2024-03-18 PROCEDURE — 82962 GLUCOSE BLOOD TEST: CPT

## 2024-03-18 PROCEDURE — 58558 HYSTEROSCOPY BIOPSY: CPT

## 2024-03-18 PROCEDURE — 88305 TISSUE EXAM BY PATHOLOGIST: CPT

## 2024-03-18 PROCEDURE — 88305 TISSUE EXAM BY PATHOLOGIST: CPT | Mod: 26

## 2024-03-18 PROCEDURE — C1782: CPT

## 2024-03-18 PROCEDURE — 58558 HYSTEROSCOPY BIOPSY: CPT | Mod: GC

## 2024-03-18 DEVICE — AVETA SMOL RESECTING DEVICE 2.9MM: Type: IMPLANTABLE DEVICE | Status: FUNCTIONAL

## 2024-03-18 RX ORDER — SITAGLIPTIN AND METFORMIN HYDROCHLORIDE 500; 50 MG/1; MG/1
1 TABLET, FILM COATED ORAL
Refills: 0 | DISCHARGE

## 2024-03-18 RX ORDER — SODIUM CHLORIDE 9 MG/ML
1000 INJECTION, SOLUTION INTRAVENOUS
Refills: 0 | Status: DISCONTINUED | OUTPATIENT
Start: 2024-03-18 | End: 2024-03-18

## 2024-03-18 RX ORDER — FENTANYL CITRATE 50 UG/ML
50 INJECTION INTRAVENOUS
Refills: 0 | Status: DISCONTINUED | OUTPATIENT
Start: 2024-03-18 | End: 2024-03-18

## 2024-03-18 RX ORDER — AMLODIPINE VALSARTAN AND HYDROCHLOROTHIAZIDE 10; 160; 25 MG/1; MG/1; MG/1
1 TABLET, FILM COATED ORAL
Refills: 0 | DISCHARGE

## 2024-03-18 RX ORDER — GLIMEPIRIDE 1 MG
1 TABLET ORAL
Refills: 0 | DISCHARGE

## 2024-03-18 RX ORDER — ROSUVASTATIN CALCIUM 5 MG/1
1 TABLET ORAL
Refills: 0 | DISCHARGE

## 2024-03-18 RX ORDER — SODIUM CHLORIDE 9 MG/ML
3 INJECTION INTRAMUSCULAR; INTRAVENOUS; SUBCUTANEOUS ONCE
Refills: 0 | Status: DISCONTINUED | OUTPATIENT
Start: 2024-03-18 | End: 2024-03-18

## 2024-03-18 RX ORDER — ONDANSETRON 8 MG/1
4 TABLET, FILM COATED ORAL ONCE
Refills: 0 | Status: DISCONTINUED | OUTPATIENT
Start: 2024-03-18 | End: 2024-03-18

## 2024-03-18 NOTE — ASU DISCHARGE PLAN (ADULT/PEDIATRIC) - ASU DC SPECIAL INSTRUCTIONSFT
Follow-up with Dr. Walker in two weeks to review pathology report.    Please contact your provider for any pain uncontrolled by medication, excessive bleeding or Fever>100.4  Please take aleve-1 tablet every 12 hours x 3 days.    May walk and climb stairs as often as you'd like, no vigorous activity, do not lift anything greater than 10lbs, nothing per vagina x 2-4 weeks, do not drive while on pain medication.

## 2024-03-18 NOTE — ASU DISCHARGE PLAN (ADULT/PEDIATRIC) - CARE PROVIDER_API CALL
Marin Walker  Gynecologic Oncology  [address] [place] [id number]  Phone: [phone]  Fax: [phone]  Follow Up Time:

## 2024-03-18 NOTE — BRIEF OPERATIVE NOTE - OPERATION/FINDINGS
Multiparous cervix. 6cm mobile anteverted uterus. Atrophic endometrium with extensive intrauterine adhesions. Ostia unable to be visualized due to adhesions. 1x2cm endometrial polyp resected.

## 2024-03-18 NOTE — ASU DISCHARGE PLAN (ADULT/PEDIATRIC) - NURSING INSTRUCTIONS
You received IV Tylenol for pain management at 12:45PM. Please DO NOT take any Tylenol (Acetaminophen) containing products, such as Vicodin, Percocet, Excedrin, and cold medications for the next 6 hours (until after 6:45PM).     You received IV Toradol for pain management at 1:15pm. Please DO NOT take Motrin/Ibuprofen/Advil/Aleve/NSAIDs (Non-Steroidal Anti-Inflammatory Drugs) for the next 6 hours (until after 7:15pmPM).

## 2024-03-18 NOTE — BRIEF OPERATIVE NOTE - NSICDXBRIEFPROCEDURE_GEN_ALL_CORE_FT
PROCEDURES:  Hysteroscopy, with endometrial sampling and polypectomy 18-Mar-2024 13:38:31  Desiree Rainey

## 2024-03-20 LAB — SURGICAL PATHOLOGY STUDY: SIGNIFICANT CHANGE UP

## 2024-04-08 ENCOUNTER — APPOINTMENT (OUTPATIENT)
Dept: GYNECOLOGIC ONCOLOGY | Facility: CLINIC | Age: 74
End: 2024-04-08
Payer: MEDICARE

## 2024-04-08 DIAGNOSIS — N84.0 POLYP OF CORPUS UTERI: ICD-10-CM

## 2024-04-08 PROCEDURE — 99212 OFFICE O/P EST SF 10 MIN: CPT

## 2024-04-08 NOTE — DISCUSSION/SUMMARY
[Doing Well] : is doing well [Excellent Pain Control] : has excellent pain control [No Sign of Infection] : is showing no signs of infection [FreeTextEntry1] : Pertinent Findings:  Uterus sounded to 6 cm. 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 One long 2 cm endometrial polyps in the uterine fundus originating in left ostia. Atrophic endometrium and only left fallopian tube ostia visualized, no normal endometrial cavity. No concern for malignancy.  Final Diagnosis 1.  Endocervix, curettings: -   Fragments of benign endocervical glands. -   Superficial strips of benign squamous epithelium.   2.  Endometrium and polyp, curettings: -   Fragments of endometrial polyps. -   Fragments of benign endocervical glands. -   Strips of benign squamous epithelium. -   Scant strips of atrophic endometrial glands.

## 2024-04-08 NOTE — REASON FOR VISIT
[Post Op] : post op visit [de-identified] : 3/18/24 [de-identified] : 1. Fractional dilation and curettage 2. Hysteroscopic polypectomy (Regan).  She reports she is recovering well, ambulating as tolerated, voiding spontaneously, having bowel movements and passing flatus. She is no longer requiring pain medications. Denies N/V, SOB, CP, fever, chills, and calf pain. Patient states she feels well form a gynecologic standpoint  [de-identified] : Pt reports doing very well and having no issues post-operatively. No discharge or bleeding.

## 2024-04-08 NOTE — ASSESSMENT
[FreeTextEntry1] : Pt is a 72 yo s/p D&C, polypectomy with pathology showing bening endometrial and endocervical polyp. No atypia or malignancy. Recommend routine gynecologic care.

## 2024-04-08 NOTE — ASSESSMENT
[FreeTextEntry1] : Pt is a 74 yo s/p D&C, polypectomy with pathology showing bening endometrial and endocervical polyp. No atypia or malignancy. Recommend routine gynecologic care.

## 2024-04-08 NOTE — END OF VISIT
[FreeTextEntry3] : Discharge to routine gynecologic care [FreeTextEntry2] : Ana María Reyes MA was present the entire duration of the patient interaction and gynecological exam.

## 2024-04-17 ENCOUNTER — APPOINTMENT (OUTPATIENT)
Dept: SURGERY | Facility: CLINIC | Age: 74
End: 2024-04-17

## 2024-06-19 ENCOUNTER — APPOINTMENT (OUTPATIENT)
Dept: OTOLARYNGOLOGY | Facility: CLINIC | Age: 74
End: 2024-06-19

## 2024-10-18 ENCOUNTER — APPOINTMENT (OUTPATIENT)
Dept: GASTROENTEROLOGY | Facility: GI CENTER | Age: 74
End: 2024-10-18

## 2025-03-20 NOTE — REASON FOR VISIT
Him/He [Post Op] : post op visit [de-identified] : 3/18/24 [de-identified] : 1. Fractional dilation and curettage 2. Hysteroscopic polypectomy (Regan).  She reports she is recovering well, ambulating as tolerated, voiding spontaneously, having bowel movements and passing flatus. She is no longer requiring pain medications. Denies N/V, SOB, CP, fever, chills, and calf pain. Patient states she feels well form a gynecologic standpoint  [de-identified] : Pt reports doing very well and having no issues post-operatively. No discharge or bleeding.

## 2025-05-01 NOTE — HISTORY OF PRESENT ILLNESS
Form placed in Provider basket for review and signature.    Form:  [facility]      Fax : [phone]     Cate Sampson      [FreeTextEntry1] : Problem List:  1. Left 1:00 3 cm FN hypoechoic mass, BI-RADS 3      -US 1.1 cm oval mass   Care Team:  PCP - Ha Chong   HPI: Patient is a 73-year-old female presenting for initial consultation on 24 for BI-RADS 3 imaging. Patient underwent screening mammogram and breast ultrasound bilateral on 23 that demonstrated in left breast 1:00 3 cm FN 1.1 cm oval mass versus prominent fat lobule. Patient called back for additional imaging on 24 left limited breast ultrasound demonstrated circumscribed oval parallel hypoechoic mass without internal vascularity 1:00 3 cm FN likely correlates with a stable mammographic finding, probably benign mass. Recommendation for repeat imaging in 6 months.   Breast History: Prior breast biopsy 10 years ago - benign. No family history of breast cancer    Imagin23: [facility]: Bilateral Screening Mammogram/Bilateral Breast Ultrasound: Density C. Impression - Left 1:00 finding on ultrasound 3 cm FN 1.1 cm oval mass versus prominent fat lobule requires additional evaluation. Left diagnostic ultrasound recommended. No mammographic evidence of malignancy in either breast. BI-RADS 0   24: [facility]: Left Limited Breast Ultrasound - Impression - Circumscribed oval parallel hypoechoic mass without internal vascularity at 1:00 3 cm FN. This likely correlates with a stable mammographic finding, which correlates in appearance and location. Probably benign mass for which a six month follow up targeted ultrasound is recommended. BI-RADS 3
